# Patient Record
Sex: FEMALE | Race: WHITE | NOT HISPANIC OR LATINO | Employment: FULL TIME | URBAN - METROPOLITAN AREA
[De-identification: names, ages, dates, MRNs, and addresses within clinical notes are randomized per-mention and may not be internally consistent; named-entity substitution may affect disease eponyms.]

---

## 2021-08-03 ENCOUNTER — TRANSCRIBE ORDERS (OUTPATIENT)
Dept: ADMINISTRATIVE | Facility: HOSPITAL | Age: 36
End: 2021-08-03

## 2021-08-03 DIAGNOSIS — N64.4 BREAST PAIN, LEFT: Primary | ICD-10-CM

## 2021-08-09 ENCOUNTER — HOSPITAL ENCOUNTER (OUTPATIENT)
Dept: ULTRASOUND IMAGING | Facility: HOSPITAL | Age: 36
Discharge: HOME | End: 2021-08-09

## 2021-08-09 ENCOUNTER — HOSPITAL ENCOUNTER (OUTPATIENT)
Dept: MAMMOGRAPHY | Facility: HOSPITAL | Age: 36
Discharge: HOME | End: 2021-08-09

## 2021-08-09 DIAGNOSIS — N64.4 BREAST PAIN, LEFT: ICD-10-CM

## 2021-08-09 PROCEDURE — 77066 DX MAMMO INCL CAD BI: CPT

## 2021-08-09 PROCEDURE — 76642 ULTRASOUND BREAST LIMITED: CPT

## 2021-08-09 PROCEDURE — G0279 TOMOSYNTHESIS, MAMMO: HCPCS

## 2023-03-20 ENCOUNTER — OFFICE VISIT (OUTPATIENT)
Dept: FAMILY MEDICINE CLINIC | Facility: CLINIC | Age: 38
End: 2023-03-20
Payer: COMMERCIAL

## 2023-03-20 VITALS
TEMPERATURE: 98.7 F | HEART RATE: 71 BPM | DIASTOLIC BLOOD PRESSURE: 67 MMHG | SYSTOLIC BLOOD PRESSURE: 104 MMHG | WEIGHT: 120.6 LBS | BODY MASS INDEX: 20.09 KG/M2 | RESPIRATION RATE: 16 BRPM | HEIGHT: 65 IN | OXYGEN SATURATION: 99 %

## 2023-03-20 DIAGNOSIS — Z11.59 ENCOUNTER FOR HEPATITIS C SCREENING TEST FOR LOW RISK PATIENT: ICD-10-CM

## 2023-03-20 DIAGNOSIS — Z13.220 NEED FOR LIPID SCREENING: ICD-10-CM

## 2023-03-20 DIAGNOSIS — Z00.00 ANNUAL PHYSICAL EXAM: Primary | ICD-10-CM

## 2023-03-20 PROCEDURE — 99395 PREV VISIT EST AGE 18-39: CPT | Performed by: FAMILY MEDICINE

## 2023-03-20 NOTE — PROGRESS NOTES
"Chief Complaint  Annual Exam    Subjective    {CC  Problem List  Visit  Diagnosis   Encounters  Notes  Medications  Labs  Result Review Imaging  Media :23}     Mara Troncoso presents to [facility] for Annual Exam.    History of Present Illness     Annual Exam-Premenopausal:    Mara Troncoso 37 y.o.female presents for annual exam.    Screenings:    Last pap  approximate date 2019 and was normal    none  BCM -- currently off and  is considering a vasectomy and using condoms.  Vaccines up to date  This patient has ever been tested for HepC: no  Labs results were discussed and ordered    Has a son, age 6.     Review of Systems   Respiratory: Negative for shortness of breath.    Cardiovascular: Negative for chest pain.   Gastrointestinal: Negative for abdominal pain and blood in stool.   Skin: Negative for rash.        Little white spot by the right eye   Hematological: Does not bruise/bleed easily.        Objective       Vital Signs:   /67 (BP Location: Right arm, Patient Position: Sitting, Cuff Size: Adult)   Pulse 71   Temp 98.7 °F (37.1 °C) (Oral)   Resp 16   Ht 165.1 cm (65\")   Wt 54.7 kg (120 lb 9.6 oz)   SpO2 99%   BMI 20.07 kg/m²     Body mass index is 20.07 kg/m².       PHQ-9 Total Score: 0     Physical Exam  Constitutional:       General: She is not in acute distress.     Appearance: Normal appearance.   HENT:      Head: Normocephalic and atraumatic.      Nose: Nose normal.   Eyes:      Conjunctiva/sclera: Conjunctivae normal.      Pupils: Pupils are equal, round, and reactive to light.   Cardiovascular:      Rate and Rhythm: Normal rate and regular rhythm.      Heart sounds: Normal heart sounds.   Pulmonary:      Effort: Pulmonary effort is normal.      Breath sounds: Normal breath sounds.   Abdominal:      General: Bowel sounds are normal.      Palpations: Abdomen is soft.   Musculoskeletal:      Cervical back: Neck supple.   Skin:     General: " Skin is warm.      Comments: Pinpoint white papule lateral to the right eye.  Scattered freckling on face and back and one 4 mm macule on the back   Neurological:      General: No focal deficit present.      Mental Status: She is alert.      Gait: Gait normal.   Psychiatric:         Behavior: Behavior normal.          Result Review  Data Reviewed:{ Labs  Result Review  Imaging  Med Tab  Media :23}     Labs and recent visits reviewed in [facility] EHR            Discussed healthy diet, exercise, adequate sleep, cancer screening, immunizations and preventative care. Annual eye exam and routine dental cleaning encouraged.        Assessment and Plan {CC Problem List  Visit Diagnosis  ROS  Review (Popup)  [facility] Maintenance  Quality  BestPractice  Medications  SmartSets  SnapShot Encounters  Media :23}   Diagnoses and all orders for this visit:    1. Annual physical exam (Primary)    2. Encounter for hepatitis C screening test for low risk patient  -     Hepatitis C Antibody    3. Need for lipid screening  -     Comprehensive Metabolic Panel  -     Lipid Panel        Patient Instructions   I have ordered lab tests today.  You should receive a phone call or a [facility] message with those results.  If you have not heard from us in 7-10 days, please call the office.      We discussed future screenings like colonoscopy and mammogram.  I would be quick to recommend a colonoscopy if you have any intestinal issues because of your cousin but that does not increase your screening recommendations.    Consider dermatology consult down the road.             No follow-ups on file.    Shonda Foster MD

## 2023-03-20 NOTE — PATIENT INSTRUCTIONS
I have ordered lab tests today.  You should receive a phone call or a [employer] message with those results.  If you have not heard from us in 7-10 days, please call the office.      We discussed future screenings like colonoscopy and mammogram.  I would be quick to recommend a colonoscopy if you have any intestinal issues because of your cousin but that does not increase your screening recommendations.    Consider dermatology consult down the road.

## 2023-03-21 LAB
ALBUMIN SERPL-MCNC: 4.8 G/DL (ref 3.8–4.8)
ALBUMIN/GLOB SERPL: 1.8 {RATIO} (ref 1.2–2.2)
ALP SERPL-CCNC: 72 IU/L (ref 44–121)
ALT SERPL-CCNC: 24 IU/L (ref 0–32)
AST SERPL-CCNC: 20 IU/L (ref 0–40)
BILIRUB SERPL-MCNC: 0.6 MG/DL (ref 0–1.2)
BUN SERPL-MCNC: 10 MG/DL (ref 6–20)
BUN/CREAT SERPL: 14 (ref 9–23)
CALCIUM SERPL-MCNC: 9.9 MG/DL (ref 8.7–10.2)
CHLORIDE SERPL-SCNC: 104 MMOL/L (ref 96–106)
CHOLEST SERPL-MCNC: 179 MG/DL (ref 100–199)
CO2 SERPL-SCNC: 24 MMOL/L (ref 20–29)
CREAT SERPL-MCNC: 0.73 MG/DL (ref 0.57–1)
EGFRCR SERPLBLD CKD-EPI 2021: 109 ML/MIN/1.73
GLOBULIN SER CALC-MCNC: 2.7 G/DL (ref 1.5–4.5)
GLUCOSE SERPL-MCNC: 92 MG/DL (ref 70–99)
HCV IGG SERPL QL IA: NON REACTIVE
HDLC SERPL-MCNC: 60 MG/DL
LDLC SERPL CALC-MCNC: 108 MG/DL (ref 0–99)
POTASSIUM SERPL-SCNC: 4.1 MMOL/L (ref 3.5–5.2)
PROT SERPL-MCNC: 7.5 G/DL (ref 6–8.5)
SODIUM SERPL-SCNC: 141 MMOL/L (ref 134–144)
TRIGL SERPL-MCNC: 57 MG/DL (ref 0–149)
VLDLC SERPL CALC-MCNC: 11 MG/DL (ref 5–40)

## 2023-03-30 ENCOUNTER — TELEPHONE (OUTPATIENT)
Dept: FAMILY MEDICINE CLINIC | Facility: CLINIC | Age: 38
End: 2023-03-30
Payer: COMMERCIAL

## 2023-03-30 NOTE — TELEPHONE ENCOUNTER
Unless she is having symptoms of a UTI, I would not worry about trace leukocytes.  It can be just from contamination.

## 2023-03-30 NOTE — TELEPHONE ENCOUNTER
Caller: Mara Troncoso    Relationship to patient: Self    Best call back number: [phone]    Patient is needing: PATIENT STATES SHE RECEIVED RESULTS ON A URINALYSIS THAT SHOWS TRACES OF WHITE BLOOD CELLS. PATIENT ASKS IF SHE NEEDS TO BE SEEN IN OFFICE?

## 2023-04-21 ENCOUNTER — TELEPHONE (OUTPATIENT)
Dept: FAMILY MEDICINE CLINIC | Facility: CLINIC | Age: 38
End: 2023-04-21

## 2023-04-21 NOTE — TELEPHONE ENCOUNTER
Caller: Mara Troncoso    Relationship to patient: Self    Best call back number: [phone]    Chief complaint: PELVIC PRESSURE/DISCOMFORT    Type of visit: OFFICE VISIT OR PAP    Requested date: ASAP    If rescheduling, when is the original appointment: NA PROVIDER WAS BOOKED OUT UNTIL AUGUST    Additional notes:PLEASE CALL

## 2023-05-24 ENCOUNTER — OFFICE VISIT (OUTPATIENT)
Dept: FAMILY MEDICINE CLINIC | Facility: CLINIC | Age: 38
End: 2023-05-24
Payer: COMMERCIAL

## 2023-05-24 VITALS
HEIGHT: 64 IN | TEMPERATURE: 98.3 F | RESPIRATION RATE: 16 BRPM | WEIGHT: 118.4 LBS | OXYGEN SATURATION: 98 % | DIASTOLIC BLOOD PRESSURE: 61 MMHG | HEART RATE: 71 BPM | SYSTOLIC BLOOD PRESSURE: 125 MMHG | BODY MASS INDEX: 20.22 KG/M2

## 2023-05-24 DIAGNOSIS — R35.0 URINARY FREQUENCY: Primary | ICD-10-CM

## 2023-05-24 DIAGNOSIS — R10.2 PELVIC PRESSURE IN FEMALE: ICD-10-CM

## 2023-05-24 LAB
BILIRUB BLD-MCNC: NEGATIVE MG/DL
CLARITY, POC: ABNORMAL
COLOR UR: YELLOW
EXPIRATION DATE: ABNORMAL
GLUCOSE UR STRIP-MCNC: NEGATIVE MG/DL
KETONES UR QL: NEGATIVE
LEUKOCYTE EST, POC: ABNORMAL
Lab: ABNORMAL
NITRITE UR-MCNC: NEGATIVE MG/ML
PH UR: 7 [PH] (ref 5–8)
PROT UR STRIP-MCNC: NEGATIVE MG/DL
RBC # UR STRIP: NEGATIVE /UL
SP GR UR: 1.03 (ref 1–1.03)
UROBILINOGEN UR QL: ABNORMAL

## 2023-05-24 NOTE — PROGRESS NOTES
"Subjective     Mara Troncoso is a 37 y.o. female who presents with   Chief Complaint   Patient presents with   • Pelvic Pain     Pressure, cramps, feels like she has to continue urinating. Going on for about 3 months off and on        History of Present Illness     Pelvic pressure and urge to urinate and goes but it will come right back.  Has been an issue on an off since February.  Has been checked for UTI.  In January stopped birth control and that's the only change.   Cycles are regular and using condoms for birth control.  Denies any unusual sensation in the vagina but has pressure.  Last pap 2/2019 and normal but hasn't been to a GYN since her son was born. No dysuria, no unusual odor or color.              Review of Systems     Objective     /61 (BP Location: Right arm, Patient Position: Sitting, Cuff Size: Adult)   Pulse 71   Temp 98.3 °F (36.8 °C) (Oral)   Resp 16   Ht 162.6 cm (64\")   Wt 53.7 kg (118 lb 6.4 oz)   LMP 05/10/2023 (Approximate)   SpO2 98%   Breastfeeding No   BMI 20.32 kg/m²     Physical Exam  Constitutional:       Appearance: Normal appearance.   Genitourinary:     Comments: Pressure with palpation of the bladder but no pain.  Neurological:      Mental Status: She is alert.   Psychiatric:         Behavior: Behavior normal.         Thought Content: Thought content normal.         Procedures     Assessment & Plan   Diagnoses and all orders for this visit:    1. Urinary frequency (Primary)  -     Urine Culture - Urine, Urine, Clean Catch  -     POC Urinalysis Dipstick, Automated    2. Pelvic pressure in female  -     US Pelvis Complete; Future         Discussion    Patient Instructions   Bladder irritants can cause urinary frequency and urgency.  Avoid caffeine, citrus, tomatoes and artificial sweeteners to see how that affects your urination.     We are checking a pelvic ultrasound and culture.  You're calling [facility] for a pelvic exam and we'll go from there.    Try a " daily antihistamine like Zyrtec.                  Shonda Foster MD           Answers for HPI/ROS submitted by the patient on 5/19/2023  Please describe your symptoms.: Pressure in pelvic region, urge to pee after already gone, occasional light cramping.  This has been happening for the past couple months. Some weeks are worse than others.  Have you had these symptoms before?: Yes  How long have you been having these symptoms?: Greater than 2 weeks  Please describe any probable cause for these symptoms. : The only thing I changed was that I stopped taking birth control in January  What is the primary reason for your visit?: Other

## 2023-05-26 LAB
BACTERIA UR CULT: NORMAL
BACTERIA UR CULT: NORMAL

## 2024-05-07 ENCOUNTER — OFFICE VISIT (OUTPATIENT)
Dept: FAMILY MEDICINE CLINIC | Facility: CLINIC | Age: 39
End: 2024-05-07
Payer: COMMERCIAL

## 2024-05-07 VITALS
OXYGEN SATURATION: 98 % | SYSTOLIC BLOOD PRESSURE: 117 MMHG | BODY MASS INDEX: 20.43 KG/M2 | DIASTOLIC BLOOD PRESSURE: 75 MMHG | HEIGHT: 64 IN | WEIGHT: 119.7 LBS | RESPIRATION RATE: 16 BRPM | HEART RATE: 64 BPM

## 2024-05-07 DIAGNOSIS — R53.83 FATIGUE, UNSPECIFIED TYPE: ICD-10-CM

## 2024-05-07 DIAGNOSIS — Z13.220 NEED FOR LIPID SCREENING: ICD-10-CM

## 2024-05-07 DIAGNOSIS — Z80.0 FAMILY HISTORY OF COLON CANCER: ICD-10-CM

## 2024-05-07 DIAGNOSIS — Z00.00 ANNUAL PHYSICAL EXAM: Primary | ICD-10-CM

## 2024-05-07 DIAGNOSIS — Z23 ENCOUNTER FOR VACCINATION: ICD-10-CM

## 2024-05-07 PROCEDURE — 90480 ADMN SARSCOV2 VAC 1/ONLY CMP: CPT | Performed by: FAMILY MEDICINE

## 2024-05-07 PROCEDURE — 91320 SARSCV2 VAC 30MCG TRS-SUC IM: CPT | Performed by: FAMILY MEDICINE

## 2024-05-07 PROCEDURE — 99395 PREV VISIT EST AGE 18-39: CPT | Performed by: FAMILY MEDICINE

## 2024-05-08 LAB
ALBUMIN SERPL-MCNC: 4.5 G/DL (ref 3.5–5.2)
ALBUMIN/GLOB SERPL: 1.7 G/DL
ALP SERPL-CCNC: 62 U/L (ref 39–117)
ALT SERPL-CCNC: 18 U/L (ref 1–33)
AST SERPL-CCNC: 22 U/L (ref 1–32)
BASOPHILS # BLD AUTO: 0.04 10*3/MM3 (ref 0–0.2)
BASOPHILS NFR BLD AUTO: 0.6 % (ref 0–1.5)
BILIRUB SERPL-MCNC: 0.6 MG/DL (ref 0–1.2)
BUN SERPL-MCNC: 8 MG/DL (ref 6–20)
BUN/CREAT SERPL: 12.5 (ref 7–25)
CALCIUM SERPL-MCNC: 9.2 MG/DL (ref 8.6–10.5)
CHLORIDE SERPL-SCNC: 103 MMOL/L (ref 98–107)
CHOLEST SERPL-MCNC: 155 MG/DL (ref 0–200)
CO2 SERPL-SCNC: 26.4 MMOL/L (ref 22–29)
CREAT SERPL-MCNC: 0.64 MG/DL (ref 0.57–1)
EGFRCR SERPLBLD CKD-EPI 2021: 116.9 ML/MIN/1.73
EOSINOPHIL # BLD AUTO: 0.1 10*3/MM3 (ref 0–0.4)
EOSINOPHIL NFR BLD AUTO: 1.5 % (ref 0.3–6.2)
ERYTHROCYTE [DISTWIDTH] IN BLOOD BY AUTOMATED COUNT: 12.7 % (ref 12.3–15.4)
GLOBULIN SER CALC-MCNC: 2.7 GM/DL
GLUCOSE SERPL-MCNC: 90 MG/DL (ref 65–99)
HCT VFR BLD AUTO: 39.1 % (ref 34–46.6)
HDLC SERPL-MCNC: 59 MG/DL (ref 40–60)
HGB BLD-MCNC: 12.6 G/DL (ref 12–15.9)
IMM GRANULOCYTES # BLD AUTO: 0.02 10*3/MM3 (ref 0–0.05)
IMM GRANULOCYTES NFR BLD AUTO: 0.3 % (ref 0–0.5)
LDLC SERPL CALC-MCNC: 86 MG/DL (ref 0–100)
LYMPHOCYTES # BLD AUTO: 1.61 10*3/MM3 (ref 0.7–3.1)
LYMPHOCYTES NFR BLD AUTO: 24.7 % (ref 19.6–45.3)
MCH RBC QN AUTO: 29.6 PG (ref 26.6–33)
MCHC RBC AUTO-ENTMCNC: 32.2 G/DL (ref 31.5–35.7)
MCV RBC AUTO: 91.8 FL (ref 79–97)
MONOCYTES # BLD AUTO: 0.44 10*3/MM3 (ref 0.1–0.9)
MONOCYTES NFR BLD AUTO: 6.7 % (ref 5–12)
NEUTROPHILS # BLD AUTO: 4.31 10*3/MM3 (ref 1.7–7)
NEUTROPHILS NFR BLD AUTO: 66.2 % (ref 42.7–76)
NRBC BLD AUTO-RTO: 0 /100 WBC (ref 0–0.2)
PLATELET # BLD AUTO: 220 10*3/MM3 (ref 140–450)
POTASSIUM SERPL-SCNC: 3.8 MMOL/L (ref 3.5–5.2)
PROT SERPL-MCNC: 7.2 G/DL (ref 6–8.5)
RBC # BLD AUTO: 4.26 10*6/MM3 (ref 3.77–5.28)
SODIUM SERPL-SCNC: 140 MMOL/L (ref 136–145)
TRIGL SERPL-MCNC: 43 MG/DL (ref 0–150)
TSH SERPL DL<=0.005 MIU/L-ACNC: 1.37 UIU/ML (ref 0.27–4.2)
VIT B12 SERPL-MCNC: 501 PG/ML (ref 211–946)
VLDLC SERPL CALC-MCNC: 10 MG/DL (ref 5–40)
WBC # BLD AUTO: 6.52 10*3/MM3 (ref 3.4–10.8)

## 2024-07-23 ENCOUNTER — OFFICE VISIT (OUTPATIENT)
Dept: FAMILY MEDICINE CLINIC | Facility: CLINIC | Age: 39
End: 2024-07-23
Payer: COMMERCIAL

## 2024-07-23 VITALS
HEART RATE: 88 BPM | RESPIRATION RATE: 16 BRPM | SYSTOLIC BLOOD PRESSURE: 112 MMHG | WEIGHT: 120 LBS | HEIGHT: 64 IN | BODY MASS INDEX: 20.49 KG/M2 | DIASTOLIC BLOOD PRESSURE: 66 MMHG | OXYGEN SATURATION: 98 %

## 2024-07-23 DIAGNOSIS — K62.89 ANAL PAIN: Primary | ICD-10-CM

## 2024-07-23 PROBLEM — Z80.0 FAMILY HISTORY OF COLON CANCER: Status: ACTIVE | Noted: 2024-07-23

## 2024-07-23 PROCEDURE — 99213 OFFICE O/P EST LOW 20 MIN: CPT | Performed by: FAMILY MEDICINE

## 2024-07-23 RX ORDER — HYDROCORTISONE 25 MG/G
CREAM TOPICAL 2 TIMES DAILY
Qty: 28 G | Refills: 3 | Status: SHIPPED | OUTPATIENT
Start: 2024-07-23

## 2024-07-23 NOTE — PATIENT INSTRUCTIONS
Organ to try the steroid cream twice a day for probably about a week till the discomfort goes away.  If that does not make the pain go away but the bump is gone then we will want the information we gathered from the colonoscopy to help figure out what is happening.    Follow through with the colonoscopy.  If you don't hear from them in the next two weeks, let us know.

## 2024-07-23 NOTE — PROGRESS NOTES
"Subjective     Mara Troncoso is a 38 y.o. female who presents with   Chief Complaint   Patient presents with    Hemorrhoids     Started hurting 3 days ago, not sure if it is hemorrhoid or not       History of Present Illness     Started having some perianal discomfort and was trying to check and thinks there is bump there.  She's had some constipation but no specific episode.  She's also been exercising with weights.  She's typically regular with BM's but will have some constipation with her cycles.  She has some pain with BM's but not blood.     She's in the process of scheduling a colonoscopy due to family history of colon cancer.  Returned the packet several weeks ago.              Review of Systems     Objective     /66 (BP Location: Left arm, Patient Position: Sitting, Cuff Size: Adult)   Pulse 88   Resp 16   Ht 162.6 cm (64\")   Wt 54.4 kg (120 lb)   SpO2 98%   Breastfeeding No   BMI 20.60 kg/m²     Physical Exam  Constitutional:       Appearance: Normal appearance.   Abdominal:      Comments: Two inflammed areas that are both tender at 6 and 11 o'clock.   Neurological:      Mental Status: She is alert.   Psychiatric:         Behavior: Behavior normal.         Thought Content: Thought content normal.         Procedures     Assessment & Plan   Diagnoses and all orders for this visit:    1. Anal pain (Primary)    Other orders  -     Hydrocortisone, Perianal, (Proctosol HC) 2.5 % rectal cream; Insert  into the rectum 2 (Two) Times a Day.  Dispense: 28 g; Refill: 3       BMI is within normal parameters. No other follow-up for BMI required.     Discussion    Patient Instructions   Organ to try the steroid cream twice a day for probably about a week till the discomfort goes away.  If that does not make the pain go away but the bump is gone then we will want the information we gathered from the colonoscopy to help figure out what is happening.    Follow through with the colonoscopy.  If you don't " hear from them in the next two weeks, let us know.               Shonda Foster MD

## 2024-08-22 ENCOUNTER — OFFICE VISIT (OUTPATIENT)
Dept: SURGERY | Facility: CLINIC | Age: 39
End: 2024-08-22
Payer: COMMERCIAL

## 2024-08-22 VITALS
HEIGHT: 64 IN | WEIGHT: 121 LBS | DIASTOLIC BLOOD PRESSURE: 82 MMHG | BODY MASS INDEX: 20.66 KG/M2 | SYSTOLIC BLOOD PRESSURE: 110 MMHG

## 2024-08-22 DIAGNOSIS — Z80.0 FAMILY HISTORY OF COLON CANCER: Primary | ICD-10-CM

## 2024-08-22 DIAGNOSIS — R19.4 CHANGE IN BOWEL HABITS: ICD-10-CM

## 2024-08-23 NOTE — PROGRESS NOTES
General Surgery H&P/Consultation    Impression/Plan:    Ms. Mara Troncoso is a 39 y.o. with change in bowel habits and two second-degree relatives with colon cancer.  I have recommended proceeding with colonoscopy with biopsies for the change in bowel habits.  Additionally, I recommended she try increasing fiber intake with use of Metamucil, Benefiber, Citrucel or similar product to attempt to bulk up her stool since it is looser than it previously had been.  She also can trial decreasing the powdered protein intake to see if change in stools have coincided with protein powder intake.  Risk and rationale of the procedure been discussed with her and she is in agreement with proceeding.    Referring Provider: No ref. provider found    Chief Complaint:    Change in bowel habits    History of Present Illness:    Mrs. Mara Troncoso is a 39 y.o. presenting for evaluation of change in bowel habits and also consideration of colonoscopy.  She has a grandmother and a cousin who have been diagnosed with colorectal cancer.  Her cousin was age 40 at time of diagnosis.  Additionally, she reports she previously had 1 bowel movement per day and now is having 3-4 bowel movements per day.  She did have an episode of hemorrhoids flared up but have since improved.  Her stools have been looser and she denies any blood in them.  She denies a family history of Crohn's disease or colitis.  He briefly used hydrocortisone cream for the irritated hemorrhoids but has stopped using this as the irritation improved.    Past Medical History:   Past Medical History:   Diagnosis Date    Allergic 1/1/1990    Allergic rhinitis     Contraceptive use     Facial paresthesia     Frequency of micturition     Hematuria     Left arm pain     Left foot pain     Low-lying placenta     Mastalgia     Other fatigue     Other specified health status     Nursing mother    Plantar wart     Pregnancy 07/05/2016    Pregnant state, incidental      "Superficial (introital) dyspareunia     Syncope and collapse     Varicella     Vitamin B deficiency           Past Surgical History:    Past Surgical History:   Procedure Laterality Date     SECTION N/A 2016    Procedure:  SECTION PRIMARY;  Surgeon: Tosha Starr MD;  Location: [facility] LABOR DELIVERY;  Service:     WISDOM TOOTH EXTRACTION           Family History:    Family History   Problem Relation Age of Onset    Hypertension Mother     Arthritis Mother     Hypertension Father     Hyperlipidemia Father     Colon cancer Maternal Grandmother 70    Arthritis Maternal Grandmother     Cancer Maternal Grandmother         Colon cancer    Diabetes Maternal Grandmother     Hyperlipidemia Maternal Grandmother     Hypertension Maternal Grandmother     Hyperlipidemia Paternal Grandmother     Colon cancer Cousin 40         from colon cancer    Hyperlipidemia Other     Stroke Other     Hypertension Other     Colon cancer Other     Coronary artery disease Other     Diabetes Other     Cancer Maternal Aunt         non hodgkin's lymphoma         Social History:    Social History     Socioeconomic History    Marital status:     Number of children: 1   Tobacco Use    Smoking status: Never    Smokeless tobacco: Never   Vaping Use    Vaping status: Never Used   Substance and Sexual Activity    Alcohol use: No     Comment: will have a couple a year    Drug use: Never    Sexual activity: Yes     Partners: Male     Birth control/protection: Condom         Allergies:   No Known Allergies    Medications:     Current Outpatient Medications:     Hydrocortisone, Perianal, (Proctosol HC) 2.5 % rectal cream, Insert  into the rectum 2 (Two) Times a Day. (Patient not taking: Reported on 2024), Disp: 28 g, Rfl: 3    Radiology/Endoscopy:    No relevant radiology to review    Labs:    Labs from 2024 reviewed, CMP and CBC unremarkable    Body mass index is 20.77 kg/m².  162.6 cm (64\")  54.9 kg (121 " lb)      Physical Exam:   Constitutional: Well-developed well-nourished, no acute distress   Respiratory: No increased work of breathing, Symmetric excursion  Cardiovascular: Well perfused, no jugular venous distention evident     BMI is within normal parameters. No other follow-up for BMI required.        Guido Callahan MD  General and Endoscopic Surgery  [facility]    [address], [address]  [place], [place], [address]  P: [phone]  F: [phone]

## 2024-10-03 ENCOUNTER — ANESTHESIA EVENT (OUTPATIENT)
Dept: GASTROENTEROLOGY | Facility: HOSPITAL | Age: 39
End: 2024-10-03
Payer: COMMERCIAL

## 2024-10-03 ENCOUNTER — HOSPITAL ENCOUNTER (OUTPATIENT)
Facility: HOSPITAL | Age: 39
Setting detail: HOSPITAL OUTPATIENT SURGERY
Discharge: HOME | End: 2024-10-03
Attending: SURGERY | Admitting: SURGERY
Payer: COMMERCIAL

## 2024-10-03 ENCOUNTER — ANESTHESIA (OUTPATIENT)
Dept: GASTROENTEROLOGY | Facility: HOSPITAL | Age: 39
End: 2024-10-03
Payer: COMMERCIAL

## 2024-10-03 VITALS
SYSTOLIC BLOOD PRESSURE: 103 MMHG | RESPIRATION RATE: 16 BRPM | DIASTOLIC BLOOD PRESSURE: 57 MMHG | OXYGEN SATURATION: 100 % | HEART RATE: 62 BPM | WEIGHT: 119.5 LBS | BODY MASS INDEX: 21.17 KG/M2 | HEIGHT: 63 IN

## 2024-10-03 DIAGNOSIS — R19.4 CHANGE IN BOWEL HABITS: ICD-10-CM

## 2024-10-03 LAB
B-HCG UR QL: NEGATIVE
EXPIRATION DATE: NORMAL
INTERNAL NEGATIVE CONTROL: NEGATIVE
INTERNAL POSITIVE CONTROL: POSITIVE
Lab: NORMAL

## 2024-10-03 PROCEDURE — 25810000003 LACTATED RINGERS PER 1000 ML: Performed by: SURGERY

## 2024-10-03 PROCEDURE — 88305 TISSUE EXAM BY PATHOLOGIST: CPT | Performed by: SURGERY

## 2024-10-03 PROCEDURE — 25010000002 PROPOFOL 10 MG/ML EMULSION: Performed by: NURSE ANESTHETIST, CERTIFIED REGISTERED

## 2024-10-03 PROCEDURE — 81025 URINE PREGNANCY TEST: CPT | Performed by: SURGERY

## 2024-10-03 PROCEDURE — 45380 COLONOSCOPY AND BIOPSY: CPT | Performed by: SURGERY

## 2024-10-03 RX ORDER — PROPOFOL 10 MG/ML
VIAL (ML) INTRAVENOUS AS NEEDED
Status: DISCONTINUED | OUTPATIENT
Start: 2024-10-03 | End: 2024-10-03 | Stop reason: SURG

## 2024-10-03 RX ORDER — SODIUM CHLORIDE 9 MG/ML
40 INJECTION, SOLUTION INTRAVENOUS AS NEEDED
Status: DISCONTINUED | OUTPATIENT
Start: 2024-10-03 | End: 2024-10-03 | Stop reason: HOSPADM

## 2024-10-03 RX ORDER — SODIUM CHLORIDE 0.9 % (FLUSH) 0.9 %
10 SYRINGE (ML) INJECTION EVERY 12 HOURS SCHEDULED
Status: DISCONTINUED | OUTPATIENT
Start: 2024-10-03 | End: 2024-10-03 | Stop reason: HOSPADM

## 2024-10-03 RX ORDER — SODIUM CHLORIDE 0.9 % (FLUSH) 0.9 %
10 SYRINGE (ML) INJECTION AS NEEDED
Status: DISCONTINUED | OUTPATIENT
Start: 2024-10-03 | End: 2024-10-03 | Stop reason: HOSPADM

## 2024-10-03 RX ORDER — SODIUM CHLORIDE, SODIUM LACTATE, POTASSIUM CHLORIDE, CALCIUM CHLORIDE 600; 310; 30; 20 MG/100ML; MG/100ML; MG/100ML; MG/100ML
30 INJECTION, SOLUTION INTRAVENOUS CONTINUOUS PRN
Status: DISCONTINUED | OUTPATIENT
Start: 2024-10-03 | End: 2024-10-03 | Stop reason: HOSPADM

## 2024-10-03 RX ADMIN — PROPOFOL 180 MCG/KG/MIN: 10 INJECTION, EMULSION INTRAVENOUS at 12:37

## 2024-10-03 RX ADMIN — PROPOFOL 100 MG: 10 INJECTION, EMULSION INTRAVENOUS at 12:36

## 2024-10-03 RX ADMIN — SODIUM CHLORIDE, POTASSIUM CHLORIDE, SODIUM LACTATE AND CALCIUM CHLORIDE 30 ML/HR: 600; 310; 30; 20 INJECTION, SOLUTION INTRAVENOUS at 11:55

## 2024-10-03 NOTE — OP NOTE
Colonoscopy Procedure Note  Mara Troncoso  1985  Date of Procedure: 10/03/24    Pre-operative Diagnosis:    Change in bowel habits, family history of colon cancer    Post-operative Diagnosis:  Normal colonoscopy    Procedure: Colonoscopy to terminal ileum with random biopsies of terminal ileum and colon    Findings/Treatments:   Normal appearance of entire examined colon, normal appearance of terminal ileum       Recommendations:   Repeat colonoscopy in 5 years for family history of colon cancer.  I will call with results of the biopsies once available.    Surgeon: Guido Callahan MD    Anesthetic: MAC per Alexis Goetz MD      Procedure Details:    MAC anesthesia was induced.  A digital rectal exam was performed along with visual inspection of the anus. The 180 Colonoscope was inserted into the rectum and advanced to the cecum, with relative ease.  Cecum was identified by the appendiceal orifice and the ileocecal valve and photographed for documentation.  Terminal ileum was intubated and normal in appearance.  Cold forcep biopsy were used for a random ileal biopsy.     A careful inspection was made as the scope was withdrawn, including a retroflexed view of the rectum.  No encountered abnormalities were noted.  Random biopsies were obtained with a cold forcep biopsy in the ascending colon, transverse colon, descending colon, sigmoid colon, and rectum. Retroflexion in the rectum revealed no abnormalities. Prep quality was good.      Guido Callahan M.D.  General, Endoscopic, and Robotic Surgery  [facility]    [address] [place] [address], [address]  [place], [place], [address]  P: [phone]  F: [phone]

## 2024-10-03 NOTE — ANESTHESIA POSTPROCEDURE EVALUATION
Patient: Mara Troncoso    Procedure Summary       Date: 10/03/24 Room / Location: [facility] ENDOSCOPY 6 /  [facility]    Anesthesia Start: 1229 Anesthesia Stop: 1306    Procedure: COLONOSCOPY TO CECUM AND TERMINAL ILEUM WITH BIOPSIES Diagnosis:       Change in bowel habits      (Change in bowel habits [R19.4])    Surgeons: Guido Callahan MD Provider: Alexis Goetz MD    Anesthesia Type: MAC ASA Status: 1            Anesthesia Type: MAC    Vitals  Vitals Value Taken Time   /70 10/03/24 1331   Temp     Pulse 62 10/03/24 1325   Resp 16 10/03/24 1324   SpO2 100 % 10/03/24 1325   Vitals shown include unfiled device data.        Post Anesthesia Care and Evaluation    Patient location during evaluation: PHASE II  Patient participation: complete - patient participated  Level of consciousness: awake and alert  Pain management: adequate    Airway patency: patent  Anesthetic complications: No anesthetic complications  PONV Status: none  Cardiovascular status: acceptable and hemodynamically stable  Respiratory status: acceptable, nonlabored ventilation and spontaneous ventilation  Hydration status: acceptable

## 2024-10-03 NOTE — DISCHARGE INSTRUCTIONS
For the rest of the day patient needs to be with a responsible adult.    For the rest of the day DO NOT work, drive, operate heavy machinery, drink alcohol, make important decisions or sign legal documents.    Advance to regular diet as tolerated, if your stomach is upset start with a light/bland diet.    Follow recommendations on procedure report if provided by your doctor.    Call Dr Callahan for problems [phone]    If these problems occur come to ER: large amounts of bleeding, trouble breathing, repeated vomiting, severe unrelieved pain, fever or chills.

## 2024-10-03 NOTE — H&P
SURGERY  Mara L Karyna  1985    10/03/24    HPI: 39 y.o. female here for colonoscopy.  She reports a change in bowel habits and to second-degree relatives with colon cancer.  I recommended proceeding with colonoscopy with biopsies for the change in bowel habits.    Past Medical History:   Diagnosis Date    Allergic 1990    Allergic rhinitis     Contraceptive use     Facial paresthesia     Frequency of micturition     Hematuria     History of COVID-19     Left arm pain     Left foot pain     Low-lying placenta     Mastalgia     Other fatigue     Other specified health status     Nursing mother    Plantar wart     Pregnancy 2016    Pregnant state, incidental     Superficial (introital) dyspareunia     Syncope and collapse     Varicella     Vitamin B deficiency        Past Surgical History:   Procedure Laterality Date     SECTION N/A 2016    Procedure:  SECTION PRIMARY;  Surgeon: Tosha Starr MD;  Location: [facility] LABOR DELIVERY;  Service:     WISDOM TOOTH EXTRACTION         has No Known Allergies.       Medication List        ASK your doctor about these medications      Hydrocortisone (Perianal) 2.5 % rectal cream  Commonly known as: Proctosol HC  Insert  into the rectum 2 (Two) Times a Day.              Family History   Problem Relation Age of Onset    Hypertension Mother     Arthritis Mother     Hypertension Father     Hyperlipidemia Father     Colon cancer Maternal Grandmother 70    Arthritis Maternal Grandmother     Cancer Maternal Grandmother         Colon cancer    Diabetes Maternal Grandmother     Hyperlipidemia Maternal Grandmother     Hypertension Maternal Grandmother     Hyperlipidemia Paternal Grandmother     Colon cancer Cousin 40         from colon cancer    Hyperlipidemia Other     Stroke Other     Hypertension Other     Colon cancer Other     Coronary artery disease Other     Diabetes Other     Cancer Maternal Aunt         non hodgkin's lymphoma        Social History     Socioeconomic History    Marital status:     Number of children: 1   Tobacco Use    Smoking status: Never    Smokeless tobacco: Never   Vaping Use    Vaping status: Never Used   Substance and Sexual Activity    Alcohol use: No     Comment: will have a couple a year    Drug use: Never    Sexual activity: Yes     Partners: Male     Birth control/protection: Condom       Vitals:    10/03/24 1149   BP: 119/79   Pulse: 75   Resp: 12   SpO2: 99%       Body mass index is 21.17 kg/m².    Physical Exam    General: No acute distress  Lungs: No labored breathing, Pulse oximetry on room air is 99%.  Heart: RRR  Abdo: Soft  Mental:  Awake, alert, and oriented      Assessment/Plan  Change in bowel habits  Proceed with colonoscopy with biopsies.  Risk, benefits discussed including risk of perforation, bleeding.    Guido Callahan MD  12:33 EDT

## 2024-10-04 LAB
CYTO UR: NORMAL
LAB AP CASE REPORT: NORMAL
PATH REPORT.FINAL DX SPEC: NORMAL
PATH REPORT.GROSS SPEC: NORMAL

## 2024-11-22 ENCOUNTER — OFFICE VISIT (OUTPATIENT)
Dept: FAMILY MEDICINE CLINIC | Facility: CLINIC | Age: 39
End: 2024-11-22
Payer: COMMERCIAL

## 2024-11-22 VITALS
HEART RATE: 73 BPM | HEIGHT: 63 IN | WEIGHT: 122.6 LBS | OXYGEN SATURATION: 99 % | SYSTOLIC BLOOD PRESSURE: 106 MMHG | DIASTOLIC BLOOD PRESSURE: 73 MMHG | RESPIRATION RATE: 16 BRPM | BODY MASS INDEX: 21.72 KG/M2

## 2024-11-22 DIAGNOSIS — R20.2 PARESTHESIA OF HAND, BILATERAL: Primary | ICD-10-CM

## 2024-11-22 DIAGNOSIS — Z23 ENCOUNTER FOR VACCINATION: ICD-10-CM

## 2024-11-22 DIAGNOSIS — M79.642 LEFT HAND PAIN: ICD-10-CM

## 2024-11-22 RX ORDER — MELOXICAM 7.5 MG/1
7.5 TABLET ORAL DAILY
Qty: 30 TABLET | Refills: 0 | Status: SHIPPED | OUTPATIENT
Start: 2024-11-22

## 2024-11-22 NOTE — PROGRESS NOTES
"Subjective     Mara Troncoso is a 39 y.o. female who presents with   Chief Complaint   Patient presents with    Tingling     In hands and fingers, been going on for a month        History of Present Illness     Bilateral hand tingling and she's noticing some weakness and trouble with  on the left.   It's been gradual in onset over time and is worsening.  She's been doing [employer] and does a lot of gripping of weights which is a more intense workout (minimum of 10# weights). She's tried a couple different otc braces without help.  She has had issues in her neck and shoulders in the past but those have been good with physical therapy.              Review of Systems     Objective     /73 (BP Location: Left arm, Patient Position: Sitting, Cuff Size: Adult)   Pulse 73   Resp 16   Ht 160 cm (63\")   Wt 55.6 kg (122 lb 9.6 oz)   SpO2 99%   Breastfeeding No   BMI 21.72 kg/m²     Physical Exam  Constitutional:       Appearance: Normal appearance.   Musculoskeletal:         General: Tenderness (thenar eminence on the left) present.   Neurological:      Mental Status: She is alert.   Psychiatric:         Behavior: Behavior normal.         Thought Content: Thought content normal.         Procedures     Assessment & Plan   Diagnoses and all orders for this visit:    1. Paresthesia of hand, bilateral (Primary)  -     meloxicam (Mobic) 7.5 MG tablet; Take 1 tablet by mouth Daily.  Dispense: 30 tablet; Refill: 0  -     Cancel: Ambulatory Referral to Physical Therapy for Evaluation & Treatment  -     Ambulatory Referral to Physical Therapy for Evaluation & Treatment    2. Left hand pain  -     meloxicam (Mobic) 7.5 MG tablet; Take 1 tablet by mouth Daily.  Dispense: 30 tablet; Refill: 0  -     Cancel: Ambulatory Referral to Physical Therapy for Evaluation & Treatment  -     Ambulatory Referral to Physical Therapy for Evaluation & Treatment    3. Encounter for vaccination  -     COVID-19 (Pfizer) 12yrs+ " (COMIRNATY)  -     Fluzone >6mos ([id number])       BMI is within normal parameters. No other follow-up for BMI required.     Discussion    Patient Instructions   I think this is a combination of carpal tunnel and muscle/tendon overuse.   Get a wrist splint to wear at night.      Avoid pressure on your wrists either flexed or extended and tight gripping until this settles down.     I gave you an anti-inflammatory to use for a couple weeks and ordered physical therapy to take to the place by your office.                    Shonda Foster MD

## 2024-11-22 NOTE — PATIENT INSTRUCTIONS
I think this is a combination of carpal tunnel and muscle/tendon overuse.   Get a wrist splint to wear at night.      Avoid pressure on your wrists either flexed or extended and tight gripping until this settles down.     I gave you an anti-inflammatory to use for a couple weeks and ordered physical therapy to take to the place by your office.

## 2025-05-09 ENCOUNTER — OFFICE VISIT (OUTPATIENT)
Dept: FAMILY MEDICINE CLINIC | Facility: CLINIC | Age: 40
End: 2025-05-09
Payer: COMMERCIAL

## 2025-05-09 VITALS
WEIGHT: 119.4 LBS | SYSTOLIC BLOOD PRESSURE: 90 MMHG | OXYGEN SATURATION: 99 % | BODY MASS INDEX: 21.16 KG/M2 | HEART RATE: 63 BPM | DIASTOLIC BLOOD PRESSURE: 62 MMHG | HEIGHT: 63 IN

## 2025-05-09 DIAGNOSIS — Z78.9 VEGETARIAN DIET: ICD-10-CM

## 2025-05-09 DIAGNOSIS — Z00.00 ANNUAL PHYSICAL EXAM: Primary | ICD-10-CM

## 2025-05-09 DIAGNOSIS — R53.83 FATIGUE, UNSPECIFIED TYPE: ICD-10-CM

## 2025-05-09 DIAGNOSIS — Z13.220 NEED FOR LIPID SCREENING: ICD-10-CM

## 2025-05-09 DIAGNOSIS — Z13.1 SCREENING FOR DIABETES MELLITUS (DM): ICD-10-CM

## 2025-05-09 PROCEDURE — 99395 PREV VISIT EST AGE 18-39: CPT | Performed by: FAMILY MEDICINE

## 2025-05-09 NOTE — PATIENT INSTRUCTIONS
I have ordered lab tests today.  You should receive a phone call or a [employer] message with those results.  If you have not heard from us in 7-10 days, please call the office.      You're doing a great job overall, keep it up!

## 2025-05-09 NOTE — PROGRESS NOTES
"Chief Complaint  Annual Exam    Subjective    {CC  Problem List  Visit  Diagnosis   Encounters  Notes  Medications  Labs  Result Review Imaging  Media :23}     Mara Troncoso presents to [facility] for Annual Exam.    History of Present Illness     Annual Exam    Last pap smear: with [facility]  Last mammogram: not indicated  Contraception: uses condoms and not planning more kids   Regular menstrual cycles: regular and lighter than when she was on OCP's and only last 4-5 days.  Last colonoscopy: 10/2024 and due in 5 due to family history.   Ever screened for Hepatitis C: yes  Vaccines: UTD  Exercise: 3-4 times a week with classes (yoga and barre) and cardio and weight training.   Smoking status: never  Alcohol use: special occasion  Sunscreen use: yes   She has added a little coffee to her diet but not a lot and not daily    She works as an audit . , one child (Dannie) in second grade.   She is tired.  It's probably just life but she'd like to follow her labs.     Review of Systems     Objective       Vital Signs:   BP 90/62   Pulse 63   Ht 160 cm (62.99\")   Wt 54.2 kg (119 lb 6.4 oz)   SpO2 99%   BMI 21.16 kg/m²     Body mass index is 21.16 kg/m².      PHQ-9 Total Score:      Physical Exam  Constitutional:       General: She is not in acute distress.     Appearance: Normal appearance.   HENT:      Head: Normocephalic and atraumatic.      Nose: Nose normal.   Eyes:      Conjunctiva/sclera: Conjunctivae normal.      Pupils: Pupils are equal, round, and reactive to light.   Cardiovascular:      Rate and Rhythm: Normal rate and regular rhythm.      Heart sounds: Normal heart sounds.   Pulmonary:      Effort: Pulmonary effort is normal.      Breath sounds: Normal breath sounds.   Abdominal:      General: Bowel sounds are normal.      Palpations: Abdomen is soft.   Musculoskeletal:      Cervical back: Neck supple.   Skin:     General: Skin is warm.   Neurological: "      General: No focal deficit present.      Mental Status: She is alert.      Gait: Gait normal.   Psychiatric:         Behavior: Behavior normal.          Result Review  Data Reviewed:{ Labs  Result Review  Imaging  Med Tab  Media :23}               Discussed healthy diet, exercise, adequate sleep, cancer screening, immunizations and preventative care. Annual eye exam and routine dental cleaning encouraged.        Assessment and Plan {CC Problem List  Visit Diagnosis  ROS  Review (Popup)  Health Maintenance  Quality  BestPractice  Medications  SmartSets  SnapShot Encounters  Media :23}   Diagnoses and all orders for this visit:    1. Annual physical exam (Primary)    2. Need for lipid screening  -     Comprehensive Metabolic Panel  -     Lipid Panel    3. Fatigue, unspecified type  -     CBC & Differential  -     TSH  -     Vitamin B12    4. Screening for diabetes mellitus (DM)  -     Hemoglobin A1c    5. Vegetarian diet  -     Vitamin B12      BMI is within normal parameters. No other follow-up for BMI required.       Patient Instructions   I have ordered lab tests today.  You should receive a phone call or a Arden Reedt message with those results.  If you have not heard from us in 7-10 days, please call the office.      You're doing a great job overall, keep it up!       Return in about 1 year (around 5/9/2026) for Annual physical.    Shonda Foster MD

## 2025-05-10 LAB
ALBUMIN SERPL-MCNC: 4.6 G/DL (ref 3.5–5.2)
ALBUMIN/GLOB SERPL: 1.7 G/DL
ALP SERPL-CCNC: 56 U/L (ref 39–117)
ALT SERPL-CCNC: 13 U/L (ref 1–33)
AST SERPL-CCNC: 20 U/L (ref 1–32)
BASOPHILS # BLD AUTO: 0.04 10*3/MM3 (ref 0–0.2)
BASOPHILS NFR BLD AUTO: 0.6 % (ref 0–1.5)
BILIRUB SERPL-MCNC: 0.6 MG/DL (ref 0–1.2)
BUN SERPL-MCNC: 9 MG/DL (ref 6–20)
BUN/CREAT SERPL: 14.8 (ref 7–25)
CALCIUM SERPL-MCNC: 9.3 MG/DL (ref 8.6–10.5)
CHLORIDE SERPL-SCNC: 104 MMOL/L (ref 98–107)
CHOLEST SERPL-MCNC: 162 MG/DL (ref 0–200)
CO2 SERPL-SCNC: 26.1 MMOL/L (ref 22–29)
CREAT SERPL-MCNC: 0.61 MG/DL (ref 0.57–1)
EGFRCR SERPLBLD CKD-EPI 2021: 117.5 ML/MIN/1.73
EOSINOPHIL # BLD AUTO: 0.11 10*3/MM3 (ref 0–0.4)
EOSINOPHIL NFR BLD AUTO: 1.7 % (ref 0.3–6.2)
ERYTHROCYTE [DISTWIDTH] IN BLOOD BY AUTOMATED COUNT: 12.4 % (ref 12.3–15.4)
GLOBULIN SER CALC-MCNC: 2.7 GM/DL
GLUCOSE SERPL-MCNC: 90 MG/DL (ref 65–99)
HBA1C MFR BLD: 5.5 % (ref 4.8–5.6)
HCT VFR BLD AUTO: 41 % (ref 34–46.6)
HDLC SERPL-MCNC: 56 MG/DL (ref 40–60)
HGB BLD-MCNC: 13.3 G/DL (ref 12–15.9)
IMM GRANULOCYTES # BLD AUTO: 0.01 10*3/MM3 (ref 0–0.05)
IMM GRANULOCYTES NFR BLD AUTO: 0.2 % (ref 0–0.5)
LDLC SERPL CALC-MCNC: 96 MG/DL (ref 0–100)
LYMPHOCYTES # BLD AUTO: 1.92 10*3/MM3 (ref 0.7–3.1)
LYMPHOCYTES NFR BLD AUTO: 30.2 % (ref 19.6–45.3)
MCH RBC QN AUTO: 29.9 PG (ref 26.6–33)
MCHC RBC AUTO-ENTMCNC: 32.4 G/DL (ref 31.5–35.7)
MCV RBC AUTO: 92.1 FL (ref 79–97)
MONOCYTES # BLD AUTO: 0.45 10*3/MM3 (ref 0.1–0.9)
MONOCYTES NFR BLD AUTO: 7.1 % (ref 5–12)
NEUTROPHILS # BLD AUTO: 3.83 10*3/MM3 (ref 1.7–7)
NEUTROPHILS NFR BLD AUTO: 60.2 % (ref 42.7–76)
NRBC BLD AUTO-RTO: 0 /100 WBC (ref 0–0.2)
PLATELET # BLD AUTO: 254 10*3/MM3 (ref 140–450)
POTASSIUM SERPL-SCNC: 4.2 MMOL/L (ref 3.5–5.2)
PROT SERPL-MCNC: 7.3 G/DL (ref 6–8.5)
RBC # BLD AUTO: 4.45 10*6/MM3 (ref 3.77–5.28)
SODIUM SERPL-SCNC: 140 MMOL/L (ref 136–145)
TRIGL SERPL-MCNC: 48 MG/DL (ref 0–150)
TSH SERPL DL<=0.005 MIU/L-ACNC: 2.28 UIU/ML (ref 0.27–4.2)
VIT B12 SERPL-MCNC: 429 PG/ML (ref 211–946)
VLDLC SERPL CALC-MCNC: 10 MG/DL (ref 5–40)
WBC # BLD AUTO: 6.36 10*3/MM3 (ref 3.4–10.8)

## (undated) DEVICE — KT ORCA ORCAPOD DISP STRL

## (undated) DEVICE — SENSR O2 OXIMAX FNGR A/ 18IN NONSTR

## (undated) DEVICE — CANN O2 ETCO2 FITS ALL CONN CO2 SMPL A/ 7IN DISP LF

## (undated) DEVICE — ADAPT CLN BIOGUARD AIR/H2O DISP

## (undated) DEVICE — SINGLE-USE BIOPSY FORCEPS: Brand: RADIAL JAW 4

## (undated) DEVICE — LN SMPL CO2 SHTRM SD STREAM W/M LUER

## (undated) DEVICE — TUBING, SUCTION, 1/4" X 10', STRAIGHT: Brand: MEDLINE